# Patient Record
Sex: MALE | Race: WHITE | HISPANIC OR LATINO | URBAN - METROPOLITAN AREA
[De-identification: names, ages, dates, MRNs, and addresses within clinical notes are randomized per-mention and may not be internally consistent; named-entity substitution may affect disease eponyms.]

---

## 2023-02-07 ENCOUNTER — OFFICE (OUTPATIENT)
Dept: URBAN - METROPOLITAN AREA CLINIC 9 | Facility: CLINIC | Age: 53
End: 2023-02-07

## 2023-02-07 VITALS
RESPIRATION RATE: 15 BRPM | SYSTOLIC BLOOD PRESSURE: 129 MMHG | HEART RATE: 71 BPM | HEIGHT: 70 IN | WEIGHT: 265 LBS | OXYGEN SATURATION: 99 % | DIASTOLIC BLOOD PRESSURE: 84 MMHG

## 2023-02-07 DIAGNOSIS — K57.92 DIVERTICULITIS OF INTESTINE, PART UNSPECIFIED, WITHOUT PERFO: ICD-10-CM

## 2023-02-07 DIAGNOSIS — R10.32 LEFT LOWER QUADRANT PAIN: ICD-10-CM

## 2023-02-07 DIAGNOSIS — K76.0 FATTY (CHANGE OF) LIVER, NOT ELSEWHERE CLASSIFIED: ICD-10-CM

## 2023-02-07 PROCEDURE — 99204 OFFICE O/P NEW MOD 45 MIN: CPT | Performed by: NURSE PRACTITIONER

## 2023-02-07 RX ORDER — METRONIDAZOLE 500 MG/1
1500 TABLET ORAL
Qty: 42 | Refills: 0 | Status: COMPLETED
Start: 2023-02-07 | End: 2023-02-27

## 2023-02-07 RX ORDER — CIPROFLOXACIN 500 MG/1
1000 TABLET, FILM COATED ORAL
Qty: 28 | Refills: 0 | Status: COMPLETED
Start: 2023-02-07 | End: 2023-02-27

## 2023-02-08 LAB
CBC, PLATELET, NO DIFFERENTIAL: HEMATOCRIT: 48.8 % (ref 37.5–51)
CBC, PLATELET, NO DIFFERENTIAL: HEMOGLOBIN: 15.9 G/DL (ref 13–17.7)
CBC, PLATELET, NO DIFFERENTIAL: MCH: 28.7 PG (ref 26.6–33)
CBC, PLATELET, NO DIFFERENTIAL: MCHC: 32.6 G/DL (ref 31.5–35.7)
CBC, PLATELET, NO DIFFERENTIAL: MCV: 88 FL (ref 79–97)
CBC, PLATELET, NO DIFFERENTIAL: PLATELETS: 303 X10E3/UL (ref 150–450)
CBC, PLATELET, NO DIFFERENTIAL: RBC: 5.54 X10E6/UL (ref 4.14–5.8)
CBC, PLATELET, NO DIFFERENTIAL: RDW: 13.3 % (ref 11.6–15.4)
CBC, PLATELET, NO DIFFERENTIAL: WBC: 7.3 X10E3/UL (ref 3.4–10.8)
COMP. METABOLIC PANEL (14): A/G RATIO: 2 (ref 1.2–2.2)
COMP. METABOLIC PANEL (14): ALBUMIN: 4.5 G/DL (ref 4–5)
COMP. METABOLIC PANEL (14): ALKALINE PHOSPHATASE: 63 IU/L (ref 44–121)
COMP. METABOLIC PANEL (14): ALT (SGPT): 78 IU/L — HIGH (ref 0–44)
COMP. METABOLIC PANEL (14): AST (SGOT): 48 IU/L — HIGH (ref 0–40)
COMP. METABOLIC PANEL (14): BILIRUBIN, TOTAL: 0.5 MG/DL (ref 0–1.2)
COMP. METABOLIC PANEL (14): BUN/CREATININE RATIO: 15 (ref 9–20)
COMP. METABOLIC PANEL (14): BUN: 12 MG/DL (ref 6–24)
COMP. METABOLIC PANEL (14): CALCIUM: 9.2 MG/DL (ref 8.7–10.2)
COMP. METABOLIC PANEL (14): CARBON DIOXIDE, TOTAL: 23 MMOL/L (ref 20–29)
COMP. METABOLIC PANEL (14): CHLORIDE: 104 MMOL/L (ref 96–106)
COMP. METABOLIC PANEL (14): CREATININE: 0.78 MG/DL (ref 0.76–1.27)
COMP. METABOLIC PANEL (14): EGFR: 109 ML/MIN/1.73 (ref 59–?)
COMP. METABOLIC PANEL (14): GLOBULIN, TOTAL: 2.3 G/DL (ref 1.5–4.5)
COMP. METABOLIC PANEL (14): GLUCOSE: 118 MG/DL — HIGH (ref 70–99)
COMP. METABOLIC PANEL (14): POTASSIUM: 4.5 MMOL/L (ref 3.5–5.2)
COMP. METABOLIC PANEL (14): PROTEIN, TOTAL: 6.8 G/DL (ref 6–8.5)
COMP. METABOLIC PANEL (14): SODIUM: 140 MMOL/L (ref 134–144)

## 2023-02-27 ENCOUNTER — OFFICE (OUTPATIENT)
Dept: URBAN - METROPOLITAN AREA CLINIC 9 | Facility: CLINIC | Age: 53
End: 2023-02-27

## 2023-02-27 VITALS
HEIGHT: 70 IN | SYSTOLIC BLOOD PRESSURE: 128 MMHG | DIASTOLIC BLOOD PRESSURE: 80 MMHG | WEIGHT: 266 LBS | OXYGEN SATURATION: 98 % | HEART RATE: 70 BPM

## 2023-02-27 DIAGNOSIS — Z12.11 ENCOUNTER FOR SCREENING FOR MALIGNANT NEOPLASM OF COLON: ICD-10-CM

## 2023-02-27 DIAGNOSIS — R74.01 ELEVATION OF LEVELS OF LIVER TRANSAMINASE LEVELS: ICD-10-CM

## 2023-02-27 DIAGNOSIS — K76.0 FATTY (CHANGE OF) LIVER, NOT ELSEWHERE CLASSIFIED: ICD-10-CM

## 2023-02-27 DIAGNOSIS — R10.32 LEFT LOWER QUADRANT PAIN: ICD-10-CM

## 2023-02-27 DIAGNOSIS — K57.92 DIVERTICULITIS OF INTESTINE, PART UNSPECIFIED, WITHOUT PERFO: ICD-10-CM

## 2023-02-27 DIAGNOSIS — E66.9 OBESITY, UNSPECIFIED: ICD-10-CM

## 2023-02-27 PROCEDURE — 99214 OFFICE O/P EST MOD 30 MIN: CPT | Performed by: NURSE PRACTITIONER

## 2023-02-27 RX ORDER — POLYETHYLENE GLYCOL 3350, SODIUM SULFATE, SODIUM CHLORIDE, POTASSIUM CHLORIDE, ASCORBIC ACID, SODIUM ASCORBATE 140-9-5.2G
KIT ORAL
Qty: 1 | Refills: 0 | Status: COMPLETED
Start: 2023-02-27 | End: 2023-05-25

## 2023-03-01 LAB
ACTIN (SMOOTH MUSCLE) ANTIBODY: 19 UNITS (ref 0–19)
ANTINUCLEAR ANTIBODIES, IFA: (no result)
CBC, PLATELET, NO DIFFERENTIAL: HEMATOCRIT: 54.1 % — HIGH (ref 37.5–51)
CBC, PLATELET, NO DIFFERENTIAL: HEMATOLOGY COMMENTS: (no result)
CBC, PLATELET, NO DIFFERENTIAL: HEMOGLOBIN: 17.7 G/DL (ref 13–17.7)
CBC, PLATELET, NO DIFFERENTIAL: MCH: 28.8 PG (ref 26.6–33)
CBC, PLATELET, NO DIFFERENTIAL: MCHC: 32.7 G/DL (ref 31.5–35.7)
CBC, PLATELET, NO DIFFERENTIAL: MCV: 88 FL (ref 79–97)
CBC, PLATELET, NO DIFFERENTIAL: PLATELETS: 74 X10E3/UL — CRITICAL LOW (ref 150–450)
CBC, PLATELET, NO DIFFERENTIAL: RBC: 6.15 X10E6/UL — HIGH (ref 4.14–5.8)
CBC, PLATELET, NO DIFFERENTIAL: RDW: 13.4 % (ref 11.6–15.4)
CBC, PLATELET, NO DIFFERENTIAL: WBC: 6.1 X10E3/UL (ref 3.4–10.8)
CERULOPLASMIN: 21.5 MG/DL (ref 16–31)
COMP. METABOLIC PANEL (14): A/G RATIO: 2.2 (ref 1.2–2.2)
COMP. METABOLIC PANEL (14): ALBUMIN: 4.8 G/DL (ref 4–5)
COMP. METABOLIC PANEL (14): ALKALINE PHOSPHATASE: 64 IU/L (ref 44–121)
COMP. METABOLIC PANEL (14): ALT (SGPT): 92 IU/L — HIGH (ref 0–44)
COMP. METABOLIC PANEL (14): AST (SGOT): 54 IU/L — HIGH (ref 0–40)
COMP. METABOLIC PANEL (14): BILIRUBIN, TOTAL: 0.5 MG/DL (ref 0–1.2)
COMP. METABOLIC PANEL (14): BUN/CREATININE RATIO: 11 (ref 9–20)
COMP. METABOLIC PANEL (14): BUN: 10 MG/DL (ref 6–24)
COMP. METABOLIC PANEL (14): CALCIUM: 9.5 MG/DL (ref 8.7–10.2)
COMP. METABOLIC PANEL (14): CARBON DIOXIDE, TOTAL: 25 MMOL/L (ref 20–29)
COMP. METABOLIC PANEL (14): CHLORIDE: 102 MMOL/L (ref 96–106)
COMP. METABOLIC PANEL (14): CREATININE: 0.9 MG/DL (ref 0.76–1.27)
COMP. METABOLIC PANEL (14): EGFR: 104 ML/MIN/1.73 (ref 59–?)
COMP. METABOLIC PANEL (14): GLOBULIN, TOTAL: 2.2 G/DL (ref 1.5–4.5)
COMP. METABOLIC PANEL (14): GLUCOSE: 113 MG/DL — HIGH (ref 70–99)
COMP. METABOLIC PANEL (14): POTASSIUM: 4.5 MMOL/L (ref 3.5–5.2)
COMP. METABOLIC PANEL (14): PROTEIN, TOTAL: 7 G/DL (ref 6–8.5)
COMP. METABOLIC PANEL (14): SODIUM: 139 MMOL/L (ref 134–144)
FE+TIBC+FER: FERRITIN: 593 NG/ML — HIGH (ref 30–400)
FE+TIBC+FER: IRON BIND.CAP.(TIBC): 247 UG/DL — LOW (ref 250–450)
FE+TIBC+FER: IRON SATURATION: 38 % (ref 15–55)
FE+TIBC+FER: IRON: 95 UG/DL (ref 38–169)
FE+TIBC+FER: UIBC: 152 UG/DL (ref 111–343)
HBSAG SCREEN: NEGATIVE
HCV ANTIBODY: HEP C VIRUS AB: NON REACTIVE
PROTHROMBIN TIME (PT): INR: 1 (ref 0.9–1.2)
PROTHROMBIN TIME (PT): PROTHROMBIN TIME: 10.5 SEC (ref 9.1–12)

## 2023-03-10 ENCOUNTER — OFFICE (OUTPATIENT)
Dept: URBAN - METROPOLITAN AREA CLINIC 14 | Facility: CLINIC | Age: 53
End: 2023-03-10

## 2023-03-10 DIAGNOSIS — K76.0 FATTY (CHANGE OF) LIVER, NOT ELSEWHERE CLASSIFIED: ICD-10-CM

## 2023-03-10 PROCEDURE — 91200 LIVER ELASTOGRAPHY: CPT | Performed by: NURSE PRACTITIONER

## 2023-05-25 ENCOUNTER — AMBULATORY SURGICAL CENTER (OUTPATIENT)
Dept: URBAN - METROPOLITAN AREA SURGERY 3 | Facility: SURGERY | Age: 53
End: 2023-05-25
Payer: COMMERCIAL

## 2023-05-25 ENCOUNTER — OFFICE (OUTPATIENT)
Dept: URBAN - METROPOLITAN AREA PATHOLOGY 12 | Facility: PATHOLOGY | Age: 53
End: 2023-05-25
Payer: COMMERCIAL

## 2023-05-25 VITALS
DIASTOLIC BLOOD PRESSURE: 77 MMHG | OXYGEN SATURATION: 92 % | HEART RATE: 67 BPM | OXYGEN SATURATION: 96 % | TEMPERATURE: 97.6 F | TEMPERATURE: 97.9 F | RESPIRATION RATE: 22 BRPM | HEART RATE: 67 BPM | SYSTOLIC BLOOD PRESSURE: 131 MMHG | DIASTOLIC BLOOD PRESSURE: 86 MMHG | HEIGHT: 70 IN | DIASTOLIC BLOOD PRESSURE: 77 MMHG | HEART RATE: 67 BPM | DIASTOLIC BLOOD PRESSURE: 64 MMHG | TEMPERATURE: 97.9 F | OXYGEN SATURATION: 95 % | WEIGHT: 258 LBS | SYSTOLIC BLOOD PRESSURE: 123 MMHG | HEART RATE: 71 BPM | SYSTOLIC BLOOD PRESSURE: 148 MMHG | OXYGEN SATURATION: 96 % | HEART RATE: 70 BPM | RESPIRATION RATE: 20 BRPM | SYSTOLIC BLOOD PRESSURE: 123 MMHG | RESPIRATION RATE: 22 BRPM | SYSTOLIC BLOOD PRESSURE: 157 MMHG | RESPIRATION RATE: 20 BRPM | HEART RATE: 69 BPM | DIASTOLIC BLOOD PRESSURE: 77 MMHG | SYSTOLIC BLOOD PRESSURE: 159 MMHG | SYSTOLIC BLOOD PRESSURE: 131 MMHG | OXYGEN SATURATION: 95 % | DIASTOLIC BLOOD PRESSURE: 64 MMHG | RESPIRATION RATE: 19 BRPM | HEIGHT: 70 IN | DIASTOLIC BLOOD PRESSURE: 86 MMHG | DIASTOLIC BLOOD PRESSURE: 103 MMHG | HEIGHT: 70 IN | HEART RATE: 70 BPM | DIASTOLIC BLOOD PRESSURE: 103 MMHG | SYSTOLIC BLOOD PRESSURE: 157 MMHG | RESPIRATION RATE: 22 BRPM | SYSTOLIC BLOOD PRESSURE: 159 MMHG | DIASTOLIC BLOOD PRESSURE: 64 MMHG | HEART RATE: 69 BPM | SYSTOLIC BLOOD PRESSURE: 148 MMHG | DIASTOLIC BLOOD PRESSURE: 103 MMHG | RESPIRATION RATE: 20 BRPM | TEMPERATURE: 97.6 F | RESPIRATION RATE: 19 BRPM | SYSTOLIC BLOOD PRESSURE: 123 MMHG | SYSTOLIC BLOOD PRESSURE: 148 MMHG | HEART RATE: 71 BPM | WEIGHT: 258 LBS | WEIGHT: 258 LBS | SYSTOLIC BLOOD PRESSURE: 157 MMHG | SYSTOLIC BLOOD PRESSURE: 131 MMHG | RESPIRATION RATE: 19 BRPM | HEART RATE: 69 BPM | OXYGEN SATURATION: 92 % | DIASTOLIC BLOOD PRESSURE: 86 MMHG | TEMPERATURE: 97.6 F | HEART RATE: 71 BPM | OXYGEN SATURATION: 96 % | TEMPERATURE: 97.9 F | HEART RATE: 70 BPM | OXYGEN SATURATION: 92 % | OXYGEN SATURATION: 95 % | SYSTOLIC BLOOD PRESSURE: 159 MMHG

## 2023-05-25 DIAGNOSIS — Z12.11 ENCOUNTER FOR SCREENING FOR MALIGNANT NEOPLASM OF COLON: ICD-10-CM

## 2023-05-25 DIAGNOSIS — K57.30 DIVERTICULOSIS OF LARGE INTESTINE WITHOUT PERFORATION OR ABS: ICD-10-CM

## 2023-05-25 DIAGNOSIS — D12.0 BENIGN NEOPLASM OF CECUM: ICD-10-CM

## 2023-05-25 DIAGNOSIS — K64.8 OTHER HEMORRHOIDS: ICD-10-CM

## 2023-05-25 DIAGNOSIS — K63.5 POLYP OF COLON: ICD-10-CM

## 2023-05-25 PROBLEM — K62.1 RECTAL POLYP: Status: ACTIVE | Noted: 2023-05-25

## 2023-05-25 PROCEDURE — 45380 COLONOSCOPY AND BIOPSY: CPT | Mod: 33 | Performed by: SPECIALIST

## 2023-05-25 PROCEDURE — 88305 TISSUE EXAM BY PATHOLOGIST: CPT | Performed by: PATHOLOGY

## 2024-01-12 ENCOUNTER — OFFICE (OUTPATIENT)
Dept: URBAN - METROPOLITAN AREA CLINIC 9 | Facility: CLINIC | Age: 54
End: 2024-01-12

## 2024-01-12 VITALS
WEIGHT: 267 LBS | SYSTOLIC BLOOD PRESSURE: 132 MMHG | OXYGEN SATURATION: 97 % | HEIGHT: 70 IN | DIASTOLIC BLOOD PRESSURE: 83 MMHG | HEART RATE: 76 BPM

## 2024-01-12 DIAGNOSIS — L29.0 PRURITUS ANI: ICD-10-CM

## 2024-01-12 DIAGNOSIS — K64.8 OTHER HEMORRHOIDS: ICD-10-CM

## 2024-01-12 DIAGNOSIS — K62.89 OTHER SPECIFIED DISEASES OF ANUS AND RECTUM: ICD-10-CM

## 2024-01-12 PROCEDURE — 99213 OFFICE O/P EST LOW 20 MIN: CPT | Performed by: NURSE PRACTITIONER

## 2024-01-12 RX ORDER — NYSTATIN 100000 [USP'U]/G
CREAM TOPICAL
Qty: 30 | Refills: 1 | Status: ACTIVE
Start: 2024-01-12

## 2024-01-12 RX ORDER — HYDROCORTISONE ACETATE PRAMOXINE HCL 2.5; 1 G/100G; G/100G
CREAM TOPICAL
Qty: 10 | Refills: 0 | Status: ACTIVE
Start: 2024-01-12

## 2024-02-14 ENCOUNTER — OFFICE (OUTPATIENT)
Dept: URBAN - METROPOLITAN AREA CLINIC 9 | Facility: CLINIC | Age: 54
End: 2024-02-14
Payer: COMMERCIAL

## 2024-02-14 VITALS
WEIGHT: 264 LBS | HEIGHT: 70 IN | SYSTOLIC BLOOD PRESSURE: 143 MMHG | HEART RATE: 64 BPM | OXYGEN SATURATION: 95 % | DIASTOLIC BLOOD PRESSURE: 85 MMHG

## 2024-02-14 DIAGNOSIS — K62.89 OTHER SPECIFIED DISEASES OF ANUS AND RECTUM: ICD-10-CM

## 2024-02-14 DIAGNOSIS — L29.0 PRURITUS ANI: ICD-10-CM

## 2024-02-14 PROCEDURE — 99213 OFFICE O/P EST LOW 20 MIN: CPT | Performed by: NURSE PRACTITIONER

## 2024-02-14 PROCEDURE — 99203 OFFICE O/P NEW LOW 30 MIN: CPT | Performed by: NURSE PRACTITIONER

## 2024-03-07 ENCOUNTER — OFFICE (OUTPATIENT)
Dept: URBAN - METROPOLITAN AREA CLINIC 9 | Facility: CLINIC | Age: 54
End: 2024-03-07

## 2024-03-07 VITALS
SYSTOLIC BLOOD PRESSURE: 148 MMHG | DIASTOLIC BLOOD PRESSURE: 88 MMHG | HEIGHT: 70 IN | OXYGEN SATURATION: 99 % | HEART RATE: 64 BPM | WEIGHT: 262 LBS

## 2024-03-07 DIAGNOSIS — K62.5 HEMORRHAGE OF ANUS AND RECTUM: ICD-10-CM

## 2024-03-07 DIAGNOSIS — R74.01 ELEVATION OF LEVELS OF LIVER TRANSAMINASE LEVELS: ICD-10-CM

## 2024-03-07 DIAGNOSIS — K76.0 FATTY (CHANGE OF) LIVER, NOT ELSEWHERE CLASSIFIED: ICD-10-CM

## 2024-03-07 DIAGNOSIS — K64.8 OTHER HEMORRHOIDS: ICD-10-CM

## 2024-03-07 DIAGNOSIS — L29.0 PRURITUS ANI: ICD-10-CM

## 2024-03-07 PROCEDURE — 99214 OFFICE O/P EST MOD 30 MIN: CPT | Performed by: NURSE PRACTITIONER

## 2024-04-05 ENCOUNTER — OFFICE (OUTPATIENT)
Dept: URBAN - METROPOLITAN AREA CLINIC 9 | Facility: CLINIC | Age: 54
End: 2024-04-05
Payer: COMMERCIAL

## 2024-04-05 VITALS
DIASTOLIC BLOOD PRESSURE: 76 MMHG | WEIGHT: 265 LBS | HEART RATE: 70 BPM | SYSTOLIC BLOOD PRESSURE: 158 MMHG | HEIGHT: 70 IN | OXYGEN SATURATION: 94 %

## 2024-04-05 DIAGNOSIS — K64.8 OTHER HEMORRHOIDS: ICD-10-CM

## 2024-04-05 PROCEDURE — 46221 LIGATION OF HEMORRHOID(S): CPT | Performed by: NURSE PRACTITIONER

## 2024-04-19 ENCOUNTER — OFFICE (OUTPATIENT)
Dept: URBAN - METROPOLITAN AREA CLINIC 9 | Facility: CLINIC | Age: 54
End: 2024-04-19
Payer: COMMERCIAL

## 2024-04-19 VITALS
SYSTOLIC BLOOD PRESSURE: 134 MMHG | DIASTOLIC BLOOD PRESSURE: 79 MMHG | HEIGHT: 70 IN | WEIGHT: 262 LBS | OXYGEN SATURATION: 96 % | HEART RATE: 68 BPM

## 2024-04-19 DIAGNOSIS — K64.8 OTHER HEMORRHOIDS: ICD-10-CM

## 2024-04-19 PROCEDURE — 46221 LIGATION OF HEMORRHOID(S): CPT | Performed by: NURSE PRACTITIONER

## 2024-05-03 ENCOUNTER — OFFICE (OUTPATIENT)
Dept: URBAN - METROPOLITAN AREA CLINIC 9 | Facility: CLINIC | Age: 54
End: 2024-05-03
Payer: COMMERCIAL

## 2024-05-03 VITALS
SYSTOLIC BLOOD PRESSURE: 145 MMHG | HEART RATE: 70 BPM | HEIGHT: 70 IN | DIASTOLIC BLOOD PRESSURE: 84 MMHG | OXYGEN SATURATION: 93 % | WEIGHT: 264 LBS

## 2024-05-03 DIAGNOSIS — K64.8 OTHER HEMORRHOIDS: ICD-10-CM

## 2024-05-03 PROCEDURE — 46221 LIGATION OF HEMORRHOID(S): CPT | Performed by: NURSE PRACTITIONER

## 2024-05-03 NOTE — SERVICEHPINOTES
Mr. Gil Presents for his 3rd HR be session. First and second banding sessions successful.   He is noticing less blood with bowel movements and having good form/soft bowel movements on fiber with increased water intake.  He is doing well and happy with banding thus far.  This will be his last banding session and less more banding is needed.  He will let us know.
br
br
OV HPI 4/19/24:br  Mild pressure and scant blood after procedure, but overall better. Having bowel movements every day to every couple days. Taking fiber and increased water as directed. He will follow in 2 weeks for next banding session.